# Patient Record
Sex: FEMALE | URBAN - METROPOLITAN AREA
[De-identification: names, ages, dates, MRNs, and addresses within clinical notes are randomized per-mention and may not be internally consistent; named-entity substitution may affect disease eponyms.]

---

## 2024-02-02 ENCOUNTER — TELEPHONE (OUTPATIENT)
Age: 29
End: 2024-02-02

## 2024-02-06 ENCOUNTER — TELEPHONE (OUTPATIENT)
Age: 29
End: 2024-02-06

## 2024-02-06 NOTE — TELEPHONE ENCOUNTER
Fern called as the pt missed her NOB appt today. She is currently 23 weeks and has had not prenatal care (will need a FAS scan-1 hour). Her medicaid transportation brought her to the wrong office. They were wanting her to be seen here today but it is already 3:13 p.m. and she would still need to travel here. They are going to work out an alternative. Will call us back with what they want to do. Just an FYI.